# Patient Record
Sex: MALE | Race: WHITE | NOT HISPANIC OR LATINO
[De-identification: names, ages, dates, MRNs, and addresses within clinical notes are randomized per-mention and may not be internally consistent; named-entity substitution may affect disease eponyms.]

---

## 2017-04-15 ENCOUNTER — HOSPITAL (OUTPATIENT)
Dept: OTHER | Age: 67
End: 2017-04-15
Attending: EMERGENCY MEDICINE

## 2017-04-15 ENCOUNTER — CHARTING TRANS (OUTPATIENT)
Dept: OTHER | Age: 67
End: 2017-04-15

## 2017-04-15 LAB
ALBUMIN SERPL-MCNC: 3.2 GM/DL (ref 3.6–5.1)
ALBUMIN/GLOB SERPL: 1 {RATIO} (ref 1–2.4)
ALP SERPL-CCNC: 75 UNIT/L (ref 45–117)
ALT SERPL-CCNC: 37 UNIT/L
ANALYZER ANC (IANC): ABNORMAL
ANION GAP SERPL CALC-SCNC: 12 MMOL/L (ref 10–20)
APTT PPP: 25 SECONDS (ref 22–30)
APTT PPP: NORMAL S
AST SERPL-CCNC: 30 UNIT/L
BASOPHILS # BLD: 0 THOUSAND/MCL (ref 0–0.3)
BASOPHILS NFR BLD: 0 %
BILIRUB SERPL-MCNC: 0.3 MG/DL (ref 0.2–1)
BUN SERPL-MCNC: 18 MG/DL (ref 6–20)
BUN/CREAT SERPL: 21 (ref 7–25)
CALCIUM SERPL-MCNC: 8.1 MG/DL (ref 8.4–10.2)
CHLORIDE: 109 MMOL/L (ref 98–107)
CO2 SERPL-SCNC: 27 MMOL/L (ref 21–32)
CREAT SERPL-MCNC: 0.84 MG/DL (ref 0.67–1.17)
DIFFERENTIAL METHOD BLD: ABNORMAL
EOSINOPHIL # BLD: 0.2 THOUSAND/MCL (ref 0.1–0.5)
EOSINOPHIL NFR BLD: 3 %
ERYTHROCYTE [DISTWIDTH] IN BLOOD: 13.2 % (ref 11–15)
GLOBULIN SER-MCNC: 3.3 GM/DL (ref 2–4)
GLUCOSE SERPL-MCNC: 101 MG/DL (ref 65–99)
HEMATOCRIT: 38.7 % (ref 39–51)
HGB BLD-MCNC: 13 GM/DL (ref 13–17)
INR PPP: 1
LYMPHOCYTES # BLD: 1.3 THOUSAND/MCL (ref 1–4)
LYMPHOCYTES NFR BLD: 27 %
MCH RBC QN AUTO: 30 PG (ref 26–34)
MCHC RBC AUTO-ENTMCNC: 33.6 GM/DL (ref 32–36.5)
MCV RBC AUTO: 89.4 FL (ref 78–100)
MONOCYTES # BLD: 0.6 THOUSAND/MCL (ref 0.3–0.9)
MONOCYTES NFR BLD: 13 %
NEUTROPHILS # BLD: 2.8 THOUSAND/MCL (ref 1.8–7.7)
NEUTROPHILS NFR BLD: 57 %
NEUTS SEG NFR BLD: ABNORMAL %
PERCENT NRBC: ABNORMAL
PLATELET # BLD: 213 THOUSAND/MCL (ref 140–450)
POTASSIUM SERPL-SCNC: 3.9 MMOL/L (ref 3.4–5.1)
PROT SERPL-MCNC: 6.5 GM/DL (ref 6.4–8.2)
PROTHROMBIN TIME: 11 SECONDS (ref 9.7–11.8)
PROTHROMBIN TIME: NORMAL
RBC # BLD: 4.33 MILLION/MCL (ref 4.5–5.9)
SODIUM SERPL-SCNC: 144 MMOL/L (ref 135–145)
WBC # BLD: 5 THOUSAND/MCL (ref 4.2–11)

## 2017-04-16 ENCOUNTER — HOSPITAL (OUTPATIENT)
Dept: OTHER | Age: 67
End: 2017-04-16
Attending: EMERGENCY MEDICINE

## 2017-05-01 ENCOUNTER — TRANSFERRED RECORDS (OUTPATIENT)
Dept: HEALTH INFORMATION MANAGEMENT | Facility: CLINIC | Age: 67
End: 2017-05-01

## 2018-01-03 ENCOUNTER — OFFICE VISIT (OUTPATIENT)
Dept: OPHTHALMOLOGY | Facility: CLINIC | Age: 68
End: 2018-01-03
Payer: COMMERCIAL

## 2018-01-03 DIAGNOSIS — D31.32 CHOROIDAL NEVUS, LEFT EYE: Primary | ICD-10-CM

## 2018-01-03 DIAGNOSIS — H52.13 MYOPIA, BILATERAL: ICD-10-CM

## 2018-01-03 DIAGNOSIS — H25.10 SENILE NUCLEAR SCLEROSIS, UNSPECIFIED LATERALITY: ICD-10-CM

## 2018-01-03 ASSESSMENT — SLIT LAMP EXAM - LIDS
COMMENTS: UPPER LID DERMATOCHALASIS
COMMENTS: UPPER LID DERMATOCHALASIS

## 2018-01-03 ASSESSMENT — REFRACTION_MANIFEST
OS_SPHERE: -2.75
OD_AXIS: 180
OS_AXIS: 030
OS_CYLINDER: +0.50
OD_SPHERE: -4.25
OD_CYLINDER: +2.25

## 2018-01-03 ASSESSMENT — EXTERNAL EXAM - LEFT EYE: OS_EXAM: NORMAL

## 2018-01-03 ASSESSMENT — REFRACTION_WEARINGRX
OS_AXIS: 023
OS_SPHERE: -3.00
OS_CYLINDER: +0.75
OD_ADD: +2.50
OD_SPHERE: -4.00
OS_ADD: +2.50
OD_CYLINDER: +1.50
OD_AXIS: 172
SPECS_TYPE: PAL

## 2018-01-03 ASSESSMENT — TONOMETRY
OD_IOP_MMHG: 19
OS_IOP_MMHG: 19
IOP_METHOD: ICARE

## 2018-01-03 ASSESSMENT — CONF VISUAL FIELD
OD_NORMAL: 1
OS_NORMAL: 1

## 2018-01-03 ASSESSMENT — VISUAL ACUITY
OD_CC: 20/20
CORRECTION_TYPE: GLASSES
OS_CC: 20/20-2

## 2018-01-03 ASSESSMENT — REFRACTION
OS_AXIS: 030
OS_SPHERE: -2.75
OD_AXIS: 175
OD_ADD: +2.50
OS_ADD: +2.50
OS_CYLINDER: +0.50
OD_CYLINDER: +2.00
OD_SPHERE: -4.00

## 2018-01-03 ASSESSMENT — CUP TO DISC RATIO
OS_RATIO: 0.3
OD_RATIO: 0.3

## 2018-01-03 ASSESSMENT — EXTERNAL EXAM - RIGHT EYE: OD_EXAM: NORMAL

## 2018-01-03 NOTE — NURSING NOTE
Chief Complaints and History of Present Illnesses   Patient presents with     COMPREHENSIVE EYE EXAM     HPI    Affected eye(s):  Both   Symptoms:     Blurred vision      Duration:  1 year   Frequency:  Constant       Do you have eye pain now?:  No      Comments:  Pt feels that LE is blurred    Latonya Mijares, COA 2:06 PM 01/03/2018

## 2018-01-03 NOTE — MR AVS SNAPSHOT
After Visit Summary   1/3/2018    Leonardo Martínez    MRN: [id number]           Patient Information     Date Of Birth          1950        Visit Information        Provider Department      1/3/2018 2:00 PM Shiraz Jasso MD [facility]        Today's Diagnoses     Choroidal nevus, left eye    -  1    Senile nuclear sclerosis, unspecified laterality - Both Eyes        Myopia, bilateral           Follow-ups after your visit        Follow-up notes from your care team     Return in about 15 months (around 4/3/2019) for Complete Eye Exam.      Who to contact     Please call your clinic at [phone] to:    Ask questions about your health    Make or cancel appointments    Discuss your medicines    Learn about your test results    Speak to your doctor   If you have compliments or concerns about an experience at your clinic, or if you wish to file a complaint, please contact [facility] Patient Relations at [phone] or email us at [email or web address]         Additional Information About Your Visit        MyChart Information     [employer] gives you secure access to your electronic health record. If you see a primary care provider, you can also send messages to your care team and make appointments. If you have questions, please call your primary care clinic.  If you do not have a primary care provider, please call [phone] and they will assist you.      [employer] is an electronic gateway that provides easy, online access to your medical records. With [employer], you can request a clinic appointment, read your test results, renew a prescription or communicate with your care team.     To access your existing account, please contact your [facility] or call [phone] for assistance.        Care EveryWhere ID     This is your Care EveryWhere ID. This could be used by other organizations to access your  [place] medical records  [id number]         Blood Pressure from Last 3 Encounters:   No data found for BP    Weight from Last 3 Encounters:   No data found for Wt              Today, you had the following     No orders found for display         Today's Medication Changes          These changes are accurate as of 1/3/18 11:59 PM.  If you have any questions, ask your nurse or doctor.               Stop taking these medicines if you haven't already. Please contact your care team if you have questions.     CHLORDIAZEPOXIDE HCL PO   Stopped by:  Shiraz Jasso MD           multivitamin  with lutein Caps per capsule   Stopped by:  Shiraz Jasso MD           OMEGA-3 FISH OIL PO   Stopped by:  Shiraz Jasso MD                    Primary Care Provider Office Phone # Fax #    Rashawn ALONZO MD Maria Guadalupe [phone] [address] [address]        Equal Access to Services     [facility]: Hadii sid johnson hadasho Soalex, waaxda luqadaha, qaybta kaalmada adeegyada, waxay chadin hayaaana jones . So [facility] [phone].    ATENCIÓN: Si habla español, tiene a gibson disposición servicios gratuitos de asistencia lingüística. Llame al [phone].    We comply with applicable federal civil rights laws and [place] laws. We do not discriminate on the basis of race, color, national origin, age, disability, sex, sexual orientation, or gender identity.            Thank you!     Thank you for choosing [facility]  for your care. Our goal is always to provide you with excellent care. Hearing back from our patients is one way we can continue to improve our services. Please take a few minutes to complete the written survey that you may receive in the mail after your visit with us. Thank you!             Your Updated Medication List - Protect others around you: Learn how to safely use, store and throw away your medicines at  www.disposemymeds.org.          This list is accurate as of 1/3/18 11:59 PM.  Always use your most recent med list.                   Brand Name Dispense Instructions for use Diagnosis    azelastine-fluticasone 137-50 MCG/ACT nasal spray    DYMISTA     Spray into both nostrils 2 times daily        beclomethasone 40 MCG/ACT Inhaler    QVAR     Inhale 1 puff into the lungs 2 times daily        COQ10 PO           CRESTOR PO           DICYCLOMINE HCL PO      Take by mouth daily        loratadine 10 MG tablet    CLARITIN     Take 10 mg by mouth daily        LUTEIN PO           RaNITidine HCl 1000 MG/40ML Soln           VITAMIN B-12 PO           VITAMIN D3 PO      Take by mouth daily

## 2018-01-29 NOTE — PROGRESS NOTES
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 or sooner as needed.    Complete documentation of historical and exam elements from today's encounter can  be found in the full encounter summary report (not reduplicated in this progress  note). I personally obtained the chief complaint(s) and history of present illness. I  confirmed and edited as necessary the review of systems, past medical/surgical  history, family history, social history, and examination findings as documented by  others; and I examined the patient myself. I personally reviewed the relevant tests,  images, and reports as documented above. I formulated and edited as necessary the  assessment and plan and discussed the findings and management plan with the  patient and family.    NETO Jasso M.D

## 2018-11-04 VITALS
DIASTOLIC BLOOD PRESSURE: 67 MMHG | RESPIRATION RATE: 18 BRPM | SYSTOLIC BLOOD PRESSURE: 131 MMHG | TEMPERATURE: 98.7 F | OXYGEN SATURATION: 99 % | HEART RATE: 71 BPM

## 2019-01-08 ENCOUNTER — OFFICE VISIT (OUTPATIENT)
Dept: OPHTHALMOLOGY | Facility: CLINIC | Age: 69
End: 2019-01-08
Payer: MEDICARE

## 2019-01-08 DIAGNOSIS — H25.10 SENILE NUCLEAR SCLEROSIS, UNSPECIFIED LATERALITY: ICD-10-CM

## 2019-01-08 DIAGNOSIS — D31.32 CHOROIDAL NEVUS, LEFT EYE: Primary | ICD-10-CM

## 2019-01-08 DIAGNOSIS — Z83.518 FAMILY HISTORY OF MACULAR DEGENERATION: ICD-10-CM

## 2019-01-08 DIAGNOSIS — H52.13 MYOPIA, BILATERAL: ICD-10-CM

## 2019-01-08 DIAGNOSIS — H52.4 PRESBYOPIA OF BOTH EYES: ICD-10-CM

## 2019-01-08 ASSESSMENT — VISUAL ACUITY
OS_CC: J1+
OD_CC+: +2
CORRECTION_TYPE: GLASSES
METHOD: SNELLEN - LINEAR
OD_CC: 20/25
OD_CC: J1+
OS_CC+: +1
OS_CC: 20/25

## 2019-01-08 ASSESSMENT — REFRACTION_WEARINGRX
SPECS_TYPE: PAL
OD_SPHERE: -4.00
OD_ADD: +2.50
OS_ADD: +2.50
OS_CYLINDER: +0.50
OD_CYLINDER: +2.00
OD_AXIS: 175
OS_AXIS: 035
OS_SPHERE: -2.75

## 2019-01-08 ASSESSMENT — REFRACTION_MANIFEST
OS_AXIS: 030
OD_AXIS: 175
OS_SPHERE: -3.00
OD_SPHERE: -4.00
OD_CYLINDER: +2.00
OD_ADD: +2.50
OS_ADD: +2.50
OS_CYLINDER: +0.50

## 2019-01-08 ASSESSMENT — TONOMETRY
OD_IOP_MMHG: 13
OS_IOP_MMHG: 11
IOP_METHOD: TONOPEN

## 2019-01-08 ASSESSMENT — CUP TO DISC RATIO
OD_RATIO: 0.3
OS_RATIO: 0.3

## 2019-01-08 ASSESSMENT — CONF VISUAL FIELD
METHOD: COUNTING FINGERS
OS_NORMAL: 1

## 2019-01-08 ASSESSMENT — EXTERNAL EXAM - LEFT EYE: OS_EXAM: NORMAL

## 2019-01-08 ASSESSMENT — EXTERNAL EXAM - RIGHT EYE: OD_EXAM: NORMAL

## 2019-01-08 ASSESSMENT — SLIT LAMP EXAM - LIDS
COMMENTS: UPPER LID DERMATOCHALASIS
COMMENTS: UPPER LID DERMATOCHALASIS

## 2019-01-08 NOTE — PROGRESS NOTES
HPI  Leonardo Martínez is a 68 year old male here for comprehensive eye exam.  Vision good with current glasses, eyes comfortable.    PMH:  Asthma, hyperlipidemia, ibs, chronic sinus disease, DVT on aspirin 81 xarelto when traveling  POH:  Glasses for myopia, no surgery, no trauma, choroidal nevus left eye- Dr. 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 Hall   Plan: follow     (H25.10) Senile nuclear sclerosis, unspecified laterality - Both Eyes  Comment: mild   Plan: not visually significant     (H52.13) Myopia, bilateral - Both Eyes  (H52.4) Presbyopia of both eyes - Both Eyes  Comment: good visual acuity   Plan: manifest refraction done and prescription for glasses given - no need to update if in good condition    (Z83.518) Family history of macular degeneration  Comment: mother  Plan: will continue lutein     -----------------------------------------------------------------------------------    Patient disposition:   Return in about 1 year (around 1/8/2020) for Comprehensive Exam. Call for sooner appointment as needed.    Complete documentation of historical and exam elements from today's encounter can be found in the full encounter summary report (not reduplicated in this progress note). I personally obtained the chief complaint(s) and history of present illness.  I have confirmed and edited as necessary the CC, HPI, PMH/PSH, social history, FMH, ROS, and exam/neuro findings as obtained by the technician or others. I have examined this patient myself and I personally viewed the image(s) and studies listed above and the documentation reflects my findings and interpretation.  I formulated and edited as necessary the  assessment and plan and discussed the findings and management plan with the patient and family.     Karin Montague MD

## 2019-11-03 ENCOUNTER — HEALTH MAINTENANCE LETTER (OUTPATIENT)
Age: 69
End: 2019-11-03

## 2020-02-10 ENCOUNTER — HEALTH MAINTENANCE LETTER (OUTPATIENT)
Age: 70
End: 2020-02-10

## 2020-11-16 ENCOUNTER — HEALTH MAINTENANCE LETTER (OUTPATIENT)
Age: 70
End: 2020-11-16

## 2021-04-03 ENCOUNTER — HEALTH MAINTENANCE LETTER (OUTPATIENT)
Age: 71
End: 2021-04-03

## 2021-09-18 ENCOUNTER — HEALTH MAINTENANCE LETTER (OUTPATIENT)
Age: 71
End: 2021-09-18

## 2022-04-30 ENCOUNTER — HEALTH MAINTENANCE LETTER (OUTPATIENT)
Age: 72
End: 2022-04-30

## 2022-11-19 ENCOUNTER — HEALTH MAINTENANCE LETTER (OUTPATIENT)
Age: 72
End: 2022-11-19

## 2023-06-01 ENCOUNTER — HEALTH MAINTENANCE LETTER (OUTPATIENT)
Age: 73
End: 2023-06-01

## 2023-08-26 ENCOUNTER — HOSPITAL ENCOUNTER (EMERGENCY)
Facility: CLINIC | Age: 73
Discharge: HOME | End: 2023-08-26
Attending: EMERGENCY MEDICINE | Admitting: EMERGENCY MEDICINE
Payer: MEDICARE

## 2023-08-26 ENCOUNTER — APPOINTMENT (OUTPATIENT)
Dept: GENERAL RADIOLOGY | Facility: CLINIC | Age: 73
End: 2023-08-26
Attending: EMERGENCY MEDICINE
Payer: MEDICARE

## 2023-08-26 VITALS
HEIGHT: 69 IN | OXYGEN SATURATION: 97 % | WEIGHT: 188 LBS | HEART RATE: 60 BPM | BODY MASS INDEX: 27.85 KG/M2 | TEMPERATURE: 97.4 F | DIASTOLIC BLOOD PRESSURE: 66 MMHG | RESPIRATION RATE: 18 BRPM | SYSTOLIC BLOOD PRESSURE: 119 MMHG

## 2023-08-26 DIAGNOSIS — R07.89 LEFT-SIDED CHEST WALL PAIN: ICD-10-CM

## 2023-08-26 LAB
ANION GAP SERPL CALCULATED.3IONS-SCNC: 11 MMOL/L (ref 7–15)
ATRIAL RATE - MUSE: 56 BPM
BASOPHILS # BLD AUTO: 0 10E3/UL (ref 0–0.2)
BASOPHILS NFR BLD AUTO: 1 %
BUN SERPL-MCNC: 21.7 MG/DL (ref 8–23)
CALCIUM SERPL-MCNC: 8.8 MG/DL (ref 8.8–10.2)
CHLORIDE SERPL-SCNC: 107 MMOL/L (ref 98–107)
CREAT SERPL-MCNC: 1.05 MG/DL (ref 0.67–1.17)
D DIMER PPP FEU-MCNC: 0.37 UG/ML FEU (ref 0–0.5)
DEPRECATED HCO3 PLAS-SCNC: 25 MMOL/L (ref 22–29)
DIASTOLIC BLOOD PRESSURE - MUSE: NORMAL MMHG
EOSINOPHIL # BLD AUTO: 0.1 10E3/UL (ref 0–0.7)
EOSINOPHIL NFR BLD AUTO: 3 %
ERYTHROCYTE [DISTWIDTH] IN BLOOD BY AUTOMATED COUNT: 12.2 % (ref 10–15)
GFR SERPL CREATININE-BSD FRML MDRD: 74 ML/MIN/1.73M2
GLUCOSE SERPL-MCNC: 171 MG/DL (ref 70–99)
HCT VFR BLD AUTO: 43.1 % (ref 40–53)
HGB BLD-MCNC: 14.5 G/DL (ref 13.3–17.7)
HOLD SPECIMEN: NORMAL
IMM GRANULOCYTES # BLD: 0 10E3/UL
IMM GRANULOCYTES NFR BLD: 0 %
INTERPRETATION ECG - MUSE: NORMAL
LYMPHOCYTES # BLD AUTO: 1.4 10E3/UL (ref 0.8–5.3)
LYMPHOCYTES NFR BLD AUTO: 31 %
MCH RBC QN AUTO: 31.1 PG (ref 26.5–33)
MCHC RBC AUTO-ENTMCNC: 33.6 G/DL (ref 31.5–36.5)
MCV RBC AUTO: 93 FL (ref 78–100)
MONOCYTES # BLD AUTO: 0.4 10E3/UL (ref 0–1.3)
MONOCYTES NFR BLD AUTO: 9 %
NEUTROPHILS # BLD AUTO: 2.6 10E3/UL (ref 1.6–8.3)
NEUTROPHILS NFR BLD AUTO: 56 %
NRBC # BLD AUTO: 0 10E3/UL
NRBC BLD AUTO-RTO: 0 /100
P AXIS - MUSE: 65 DEGREES
PLATELET # BLD AUTO: 154 10E3/UL (ref 150–450)
POTASSIUM SERPL-SCNC: 4.2 MMOL/L (ref 3.4–5.3)
PR INTERVAL - MUSE: 166 MS
QRS DURATION - MUSE: 92 MS
QT - MUSE: 410 MS
QTC - MUSE: 395 MS
R AXIS - MUSE: -57 DEGREES
RBC # BLD AUTO: 4.66 10E6/UL (ref 4.4–5.9)
SODIUM SERPL-SCNC: 143 MMOL/L (ref 136–145)
SYSTOLIC BLOOD PRESSURE - MUSE: NORMAL MMHG
T AXIS - MUSE: 36 DEGREES
TROPONIN T SERPL HS-MCNC: 12 NG/L
VENTRICULAR RATE- MUSE: 56 BPM
WBC # BLD AUTO: 4.5 10E3/UL (ref 4–11)

## 2023-08-26 PROCEDURE — 93005 ELECTROCARDIOGRAM TRACING: CPT

## 2023-08-26 PROCEDURE — 82310 ASSAY OF CALCIUM: CPT | Performed by: EMERGENCY MEDICINE

## 2023-08-26 PROCEDURE — 99285 EMERGENCY DEPT VISIT HI MDM: CPT

## 2023-08-26 PROCEDURE — 36415 COLL VENOUS BLD VENIPUNCTURE: CPT | Performed by: EMERGENCY MEDICINE

## 2023-08-26 PROCEDURE — 85379 FIBRIN DEGRADATION QUANT: CPT | Performed by: EMERGENCY MEDICINE

## 2023-08-26 PROCEDURE — 71101 X-RAY EXAM UNILAT RIBS/CHEST: CPT | Mod: LT

## 2023-08-26 PROCEDURE — 85025 COMPLETE CBC W/AUTO DIFF WBC: CPT | Performed by: EMERGENCY MEDICINE

## 2023-08-26 PROCEDURE — 84484 ASSAY OF TROPONIN QUANT: CPT | Performed by: EMERGENCY MEDICINE

## 2023-08-26 ASSESSMENT — ACTIVITIES OF DAILY LIVING (ADL): ADLS_ACUITY_SCORE: 35

## 2023-08-26 NOTE — ED TRIAGE NOTES
Pt reports onset of aching left lower chest pain that is worse with deep breaths. Pt reports some lightheadedness. History of DVT. Takes xarelto when traveling but has not taken recently.      Triage Assessment       Row Name 08/26/23 1456       Triage Assessment (Adult)    Airway WDL WDL       Respiratory WDL    Respiratory WDL X  Pain with deep breaths       Skin Circulation/Temperature WDL    Skin Circulation/Temperature WDL WDL       Cardiac WDL    Cardiac WDL X;chest pain       Chest Pain Assessment    Chest Pain Location anterior chest, left    Character aching    Chest Pain Intervention 12-lead ECG obtained;cardiac biomarkers drawn       Peripheral/Neurovascular WDL    Peripheral Neurovascular WDL WDL       Cognitive/Neuro/Behavioral WDL    Cognitive/Neuro/Behavioral WDL WDL

## 2023-08-26 NOTE — DISCHARGE INSTRUCTIONS
You were seen in the emergency department today for evaluation of left-sided chest pain.  Your blood work does not show any evidence of this being caused by a blood clot in your lungs or any problems with your heart.  Chest x-ray did not show any rib fractures or other causes for your symptoms.  I recommend the following:    You can continue using Tylenol or ibuprofen at home for pain.  If you notice a rash developing in the area rehab pain, return here, urgent care, or your primary care provider as this may be shingles  You might also find pain relief from applying ice to the area.  Return for difficulty breathing, loss of consciousness, or with worsening chest pain.    Discharge Instructions  Musculoskeletal Chest Pain    You have been seen today because of a chest injury.  You may have contusion (bruise) of the chest or a rib fracture (broken bone).  Rib fractures can be hard to see on x-ray, so we cannot always be sure whether your rib is broken or bruised. Fortunately, the treatment of these injuries is usually the same, and includes pain control and preventing complications.    Generally, every Emergency Department visit should have a follow-up clinic visit with either a primary or a specialty clinic/provider. Please follow-up as instructed by your emergency provider today.    Return to the Emergency Department if:  You become short of breath.  You develop a fever over 101.5 F.  You pass out or become very weak or pale.  You have abdominal (belly) pain that is new or increasing.  You cough up blood.  You have new symptoms or anything that worries you.    Follow-up with your provider:  As directed by your provider today.  If you are not improved in two weeks.  If you need more pain medicine, since we do not refill pain pills through the Emergency Department.    Home care instructions:  Chest injuries can be painful.  You may take an over-the-counter pain medication such as Tylenol  (acetaminophen), Advil   (ibuprofen), Motrin  (ibuprofen) or Aleve  (naproxen).  Applying ice packs to the painful area can help your pain.   Holding a pillow against your chest can help with pain when you need to move or cough.  You may need to rest and avoid lifting particularly in the first few days after your injury.  Prevention of pneumonia (lung infection) is also a part of managing chest injuries.  Because it can hurt to take deep breaths, you could develop collapsed areas of lung that can develop infection.  To prevent this, you need to take ten very deep breaths every hour while you are awake. Sometimes you will be given a device called an incentive spirometer to help with this. You also need to make yourself cough every hour.  Rib belts or binders are not generally recommended, since they may increase the risk of pneumonia. If you do use one, use it for only short periods of time.   If you were given a prescription for medicine here today, be sure to read all of the information (including the package insert) that comes with your prescription.  This will include important information about the medicine, its side effects, and any warnings that you need to know about.  The pharmacist who fills the prescription can provide more information and answer questions you may have about the medicine.  If you have questions or concerns that the pharmacist cannot address, please call or return to the Emergency Department.   Remember that you can always come back to the Emergency Department if you are not able to see your regular provider in the amount of time listed above, if you get any new symptoms, or if there is anything that worries you.

## 2023-08-26 NOTE — ED PROVIDER NOTES
ED ATTENDING PHYSICIAN NOTE:   I evaluated this patient in conjunction with Queenie Guerra PA-C  I have participated in the care of the patient and personally performed key elements of the history, exam, and medical decision making.     Leonardo Martínez is a 73 year old male who presents to the emergency department with chest pain. The patient states he had onset of left-sided chest pain approximately 8 hours ago. Pain worsens with deep inhalation. No exacerbation of pain with movement. He has history of DVT in 2017 after long airplane travel. He denies recent long-distance travel. He denies leg swelling or pain. He denies fevers, chills, or cough. He notes he went to [facility] today and had an EKG done, then was subsequently told to present to the emergency department for further evaluation.        Independent Historian:   Spouse/Partner - They report the patient was water skiing yesterday.         EXAM:   Heart has regular rate and rhythm, lungs are clear to auscultation, left anterior/inferior chest wall tenderness without crepitance or rash to the chest or back.  Abdominal exam is without tenderness or rebound or guarding.    Results:  Ribs XR, unilat 3 views + PA chest,  left   Final Result   IMPRESSION: Slight atelectasis both lung bases. Lungs otherwise clear bilaterally. No rib fractures.        Labs Ordered and Resulted from Time of ED Arrival to Time of ED Departure   BASIC METABOLIC PANEL - Abnormal       Result Value    Sodium 143      Potassium 4.2      Chloride 107      Carbon Dioxide (CO2) 25      Anion Gap 11      Urea Nitrogen 21.7      Creatinine 1.05      Calcium 8.8      Glucose 171 (*)     GFR Estimate 74     D DIMER QUANTITATIVE - Normal    D-Dimer Quantitative 0.37     TROPONIN T, HIGH SENSITIVITY - Normal    Troponin T, High Sensitivity 12     CBC WITH PLATELETS AND DIFFERENTIAL    WBC Count 4.5      RBC Count 4.66      Hemoglobin 14.5      Hematocrit 43.1      MCV 93      MCH 31.1      MCHC  33.6      RDW 12.2      Platelet Count 154      % Neutrophils 56      % Lymphocytes 31      % Monocytes 9      % Eosinophils 3      % Basophils 1      % Immature Granulocytes 0      NRBCs per 100 WBC 0      Absolute Neutrophils 2.6      Absolute Lymphocytes 1.4      Absolute Monocytes 0.4      Absolute Eosinophils 0.1      Absolute Basophils 0.0      Absolute Immature Granulocytes 0.0      Absolute NRBCs 0.0       Independent Interpretation (X-rays, CTs, rhythm strip):  None    Consultations/Discussion of Management or Tests:  None     MEDICAL DECISION MAKING/ASSESSMENT AND PLAN:   Patient presents to the ER for evaluation of chest discomfort.  Vital signs reassuring.  His discomfort is somewhat reproducible on exam.  No signs of zoster or trauma to the chest.  ECG and troponin and history do not suggest ACS.  Normal D-dimer speaks against occult PE.  Chest x-ray is clear without signs of pneumonia, pneumothorax, other sinister pathology such as aortic dissection.  Plan for discharge with close primary care follow-up.  Return precautions discussed prior to discharge.    DIAGNOSIS:   Final diagnoses:   Left-sided chest wall pain     DISPOSITION:   Discharged to home.    Scribe Disclosure:  I, Donna Euceda, am serving as a scribe at 5:19 PM on 8/26/2023 to document services personally performed by Prasad Lozano MD based on my observations and the provider's statements to me.        Prasad Lozano MD  08/26/23 5628

## 2023-08-26 NOTE — ED PROVIDER NOTES
"History     Chief Complaint:  Chest Pain       HPI   Leonardo Martínez is a 73 year old male with history of DVT, takes Xarelto only when traveling, presenting today for evaluation of left-sided chest pain since he woke up this morning, ~8 hours prior to my evaluation.  Pain is present in the left lower chest and does not radiate to the shoulder or the abdomen.  It is worse with taking a deep breath but not with any movement.  He had associated lightheadedness this morning.  [facility] and was [facility] here due to EKG.  There is no fevers, cough, shortness of breath, nausea, vomiting, abdominal pain, calf swelling or calf tenderness.  He has not had any recent travel.  Of note, he was water skiing yesterday.     Independent Historian:   None - Patient Only    Review of External Notes:   Reviewed office visit from 2/2/23.      Medications:    azelastine-fluticasone (DYMISTA) 137-50 MCG/ACT nasal spray  beclomethasone (QVAR) 40 MCG/ACT Inhaler  Cholecalciferol (VITAMIN D3 PO)  Coenzyme Q10 (COQ10 PO)  Cyanocobalamin (VITAMIN B-12 PO)  DICYCLOMINE HCL PO  loratadine (CLARITIN) 10 MG tablet  LUTEIN PO  RaNITidine HCl 1000 MG/40ML SOLN  Rosuvastatin Calcium (CRESTOR PO)  UNABLE TO FIND      Past Medical History:    Past Medical History:   Diagnosis Date    Asthma     Choroidal nevus of left eye     Dry eyes     Hyperlipidaemia     IBS (irritable bowel syndrome)     Nonsenile cataract        Past Surgical History:    Past Surgical History:   Procedure Laterality Date    NO HISTORY OF SURGERY        Physical Exam   Patient Vitals for the past 24 hrs:   BP Temp Temp src Pulse Resp SpO2 Height Weight   08/26/23 1729 -- -- -- 60 18 97 % -- --   08/26/23 1452 119/66 97.4  F (36.3  C) Temporal 58 14 96 % 1.753 m (5' 9\") 85.3 kg (188 lb)      Physical Exam  /66   Pulse 60   Temp 97.4  F (36.3  C) (Temporal)   Resp 18   Ht 1.753 m (5' 9\")   Wt 85.3 kg (188 lb)   SpO2 97%   BMI 27.76 kg/m     General: " Well-developed and well-nourished. Patient appears comfortable.   Head: Atraumatic. Normocephalic.  EENT: PERRL. EOMI. Moist mucus membranes.   CV: Regular rate and rhythm. No appreciable murmurs, rubs, or gallops.  Reproducible tenderness over the right inferolateral chest wall.  Respiratory: Breathing comfortably on room air. Lungs clear to auscultation bilaterally without wheezes, rhonchi, or rales.  GI: Soft, non-distended. Non-tender abdomen. No rebound, rigidity, or guarding.   Msk: Extremities without tenderness to palpation or deformity.  Skin: Warm and dry. No rashes.  Neuro: Awake, alert, and conversant. No focal neurologic deficits.   Psych: Appropriate mood and affect.    Emergency Department Course   ECG  ECG taken at 1448, ECG read at 1453  Sinus bradycardia.  Left axis deviation.  Low voltage  QRS. Possible lateral infarct, age undetermined.    Rate 56 bpm. GA interval 166 ms. QRS duration 92 ms. QT/QTc 410/395 ms. P-R-T axes 65 -57 36.     Imaging:  Ribs XR, unilat 3 views + PA chest,  left   Final Result   IMPRESSION: Slight atelectasis both lung bases. Lungs otherwise clear bilaterally. No rib fractures.         Report per radiology    Laboratory:  Labs Ordered and Resulted from Time of ED Arrival to Time of ED Departure   BASIC METABOLIC PANEL - Abnormal       Result Value    Sodium 143      Potassium 4.2      Chloride 107      Carbon Dioxide (CO2) 25      Anion Gap 11      Urea Nitrogen 21.7      Creatinine 1.05      Calcium 8.8      Glucose 171 (*)     GFR Estimate 74     D DIMER QUANTITATIVE - Normal    D-Dimer Quantitative 0.37     TROPONIN T, HIGH SENSITIVITY - Normal    Troponin T, High Sensitivity 12     CBC WITH PLATELETS AND DIFFERENTIAL    WBC Count 4.5      RBC Count 4.66      Hemoglobin 14.5      Hematocrit 43.1      MCV 93      MCH 31.1      MCHC 33.6      RDW 12.2      Platelet Count 154      % Neutrophils 56      % Lymphocytes 31      % Monocytes 9      % Eosinophils 3      %  Basophils 1      % Immature Granulocytes 0      NRBCs per 100 WBC 0      Absolute Neutrophils 2.6      Absolute Lymphocytes 1.4      Absolute Monocytes 0.4      Absolute Eosinophils 0.1      Absolute Basophils 0.0      Absolute Immature Granulocytes 0.0      Absolute NRBCs 0.0        Emergency Department Course & Assessments:  Interventions:  Medications - No data to display     Assessments and Consultations:  Patient was seen in conjunction with attending physician Dr. Lozano.    0954   I performed my initial evaluation of the patient  ED Course as of 08/26/23 1818   Sat Aug 26, 2023   1705 Patient and wife updated on results and plan.  His pain is nearly resolved.       Independent Interpretation (X-rays, CTs, rhythm strip):  Rib x-ray without any evidence of fracture.     Social Determinants of Health affecting care:   None    Disposition:  The patient was discharged to home.     Impression & Plan    Medical Decision Making:  This is a 73-year-old male presenting as above.  On arrival, vital signs were stable.  Initial EKG performed did not show any arrhythmia or ischemic changes.  Differential diagnosis included PE, ACS, aortic dissection, musculoskeletal etiology, pneumonia, pneumothorax.  Given his history of DVT, did order a D-dimer which was thankfully negative.  History is not suggestive of aortic dissection.  He has had symptoms for 8 hours and initial troponin was negative, without any ischemia on the EKG, do not feel this is necessary to repeat as my suspicion that this is cardiac is low.  He does have reproducible pain on exam, and on further discussion with his wife, he was waterskiing yesterday.  There is likely a musculoskeletal component to his pain.  Rib x-ray was performed which did not show any acute fractures.  His pain is feeling much improved now and was nearly gone prior to discharge home.  At this time, my suspicion is that pain is musculoskeletal in nature.  His work-up here was  unremarkable.  I recommended pain management at home.  He declined anything further for pain. I discussed the plan with the patient. All questions were answered and they were in agreement the plan. We discussed signs and symptoms that should prompt immediate reevaluation, and they vocalized understanding. Patient is safe for discharge to home.     Diagnosis:    ICD-10-CM    1. Left-sided chest wall pain  R07.89               Queenie Guerra PA-C  08/26/23 1818

## 2024-04-07 ENCOUNTER — HEALTH MAINTENANCE LETTER (OUTPATIENT)
Age: 74
End: 2024-04-07

## 2025-04-19 ENCOUNTER — HEALTH MAINTENANCE LETTER (OUTPATIENT)
Age: 75
End: 2025-04-19